# Patient Record
(demographics unavailable — no encounter records)

---

## 2024-10-29 NOTE — DISCUSSION/SUMMARY
[de-identified] : Discussed with patient the pathomechanics and pathophysiology of this condition.  Discussed with patient that I utilize a ladder analogy when determining treatment plans for ankle sprains.  Lowest rung on the ladder and easiest thing to do involves a trial of cam boot immobilization for 10 to 14 days, anti-inflammatories in the form of Mobic (a prescription was provided), weightbearing as tolerated, compression sock (information provided), and a plantar fascia night splint (information provided).  We discussed the risks and benefits associated with nonsteroidal anti-inflammatories.  Those risks include bleeding, kidney, GI issues.  Patient was instructed to take the medications with food.  And she was instructed to discontinue them should any abdominal or stomach discomfort occur.  I will see her back at 2 weeks time.  At that point we will reevaluate her degree of instability and initiate a functional rehabilitation protocol.  She will continue to maintain sleeping in the night splint for approximately 6 weeks in total.  Additionally she will begin on range of motion, edema control no passive or active inversion is to occur for at least 6 weeks.  Should her symptoms fail to improve or worsen by 6 weeks we will consider getting an advanced imaging in the form of MRI to ensure that we are not missing any other concurrent soft tissue pathology or chondral injuries that could be contributing to her symptomatology.  I am optimistic based on her history and physical examination that she will be able to heal this without requiring surgical fixation.  In the event that surgery is needed we discussed that appropriate surgical treatment would be based upon MRI findings, which would include either ligament repair versus reconstruction and potentially arthroscopic surgery, limited versus extensive debridement and addressing any chondral lesions that are present and/or any other associated pathology.  All questions were asked and answered.  Patient is in agreement with the plan.  She will follow-up accordingly.   The patient's current medication management of their orthopedic diagnosis was reviewed today: (1) We discussed a comprehensive treatment plan that included possible pharmaceutical management involving the use of prescription strength medications including but not limited to options such as oral Naprosyn 500mg BID, once daily Meloxicam 15 mg, or 500-650 mg Tylenol versus over the counter oral medications and topical prescription NSAID vs over the counter Voltaren gel. (2) There is a moderate risk of morbidity with further treatment, especially from use of prescription strength medications and possible side effects of these medications which include upset stomach with oral medications, skin reactions to topical medications and cardiac/renal issues with long term use. (3) I recommended that the patient follow-up with their medical physician to discuss any significant specific potential issues with long term medication use such as interactions with current medications or with exacerbation of underlying medical comorbidities. (4) The benefits and risks associated with use of injectable, oral or topical, prescription and over the counter anti-inflammatory medications were discussed with the patient. The patient voiced understanding of the risks including but not limited to bleeding, stroke, kidney dysfunction, heart disease, and were referred to the black box warning label for further information.     Plan for next visit: 1.  Assess ATFL CFL assess anterior drawer assess swelling, consider transition to ASO and PT with restriction of inversion active and passive for the next 4 weeks.

## 2024-10-29 NOTE — HISTORY OF PRESENT ILLNESS
[de-identified] : Date of Injury/Onset: 2 weeks ago Pain: At Rest: 5 With Activity: 6 Mechanism of injury: This is NOT a Work Related Injury being treated under Worker's Compensation. This is NOT an athletic injury occurring associated with an interscholastic or organized sports team. Quality of symptoms: Improves with: Worse with: Prior treatment: air cast Prior Imaging: x-rays Reports Available For Review Today: no Out of work/sport: working School/Sport/Position/Occupation:    10/29/2024 TANIA she is here today for evaluation of left ankle sprain. Patient denies JUDAH. Patient reports pain began about 2 weeks ago after walking a lot lately. Patient visited [facility] had x-rays done, was sent home with an air cast. Patient had been taken ibuprofen for pain relief. Patient reports some tingling and pain. Patient states pain is worse with stairs, standing, sitting and walking. Patient notes pain is localized.  Patient reports left ankle sprain back in January 2024 at work after lifting heavy boxes.

## 2024-10-29 NOTE — IMAGING
[de-identified] : Patient ambulates with a Antalgic gait   Left Foot and Ankle: Inspection: Erythema: None Swelling: Diffuse swelling laterally, anteriorly, medially Ecchymosis: None Abrasions: None Rashes: None Surgical Scars: None Effusion: None Atrophy: None Deformity: None Pes [place] Valgus: Negative Pes Cavus: Negative Hallux Valgus: Negative Clawtoe/Hammertoe: Negative  Palpation: Crepitus: None Proximal Fibula: Nontender Distal Fibula: Nontender Medial Malleolus: tender Lateral Malleolus: tender AITFL: tender PITFL: Nontender ATFL: tender CFL: tender Deltoid: tender Calcaneus: Nontender Talar Head/Neck: Nontender Lateral Process of Talus: Nontender Anterior Facet of Calcaneus: Nontender Peroneal Tendons: tender Posterior Tibialis: Nontender Achilles Tendon: Nontender & Intact Achilles Insertion: Nontender Retrocalcaneal Bursa: Nontender Anterior Capsule: tender Subtalar Joint: Nontender Talonavicular Joint: Nontender Calcaneocuboid Joint: Nontender Heel pad: Nontender Medial Tubercle of Calcaneus: Nontender Plantar Fascia: Nontender Midfoot: Nontender Forefoot: Nontender Sesamoids: Nontender  ROM: Ankle Dorsiflexion: 0 degrees Ankle Plantar Flexion: 35 degrees Eversion: 15 degrees Inversion: 25 degrees, painful  Motor: Dorsiflexion: 5 out of 5 Plantar Flexion: 5 out of 5 Inversion: 5  out of 5 Eversion: 5 out of 5, painful  Provocative Testing: Anterior Drawer: Positive Syndesmosis Squeeze Test: Negative Circumduction test: Negative Resisted ER: Painful  Axial Grind 1st MTP: Painless Neurologic Exam: L4-S1 Sensation: Grossly Intact Tinels: Negative  Vascular Exam/Pulses: Dorsalis Pedis: 2+ Posterior Tibialis: 2+ Capillary Refill: <2 Seconds Other Exams: None Pertinent Contralateral Findings: None

## 2024-11-19 NOTE — DISCUSSION/SUMMARY
[de-identified] : Discussed with patient the pathomechanics and pathophysiology of this condition.  Discussed with patient that I utilize a ladder analogy when determining treatment plans for ankle sprains.  Lowest rung on the ladder and easiest thing to do involves a trial of cam boot immobilization for 10 to 14 days, anti-inflammatories in the form of Mobic (a prescription was provided), weightbearing as tolerated, compression sock (information provided), and a plantar fascia night splint (information provided).  We discussed the risks and benefits associated with nonsteroidal anti-inflammatories.  Those risks include bleeding, kidney, GI issues.  Patient was instructed to take the medications with food.  And she was instructed to discontinue them should any abdominal or stomach discomfort occur.  I will see her back at 2 weeks time.  At that point we will reevaluate her degree of instability and initiate a functional rehabilitation protocol.  She will continue to maintain sleeping in the night splint for approximately 6 weeks in total.  Additionally she will begin on range of motion, edema control no passive or active inversion is to occur for at least 6 weeks.  Should her symptoms fail to improve or worsen by 6 weeks we will consider getting an advanced imaging in the form of MRI to ensure that we are not missing any other concurrent soft tissue pathology or chondral injuries that could be contributing to her symptomatology.  I am optimistic based on her history and physical examination that she will be able to heal this without requiring surgical fixation.  In the event that surgery is needed we discussed that appropriate surgical treatment would be based upon MRI findings, which would include either ligament repair versus reconstruction and potentially arthroscopic surgery, limited versus extensive debridement and addressing any chondral lesions that are present and/or any other associated pathology.  All questions were asked and answered.  Patient is in agreement with the plan.  She will follow-up accordingly.   The patient's current medication management of their orthopedic diagnosis was reviewed today: (1) We discussed a comprehensive treatment plan that included possible pharmaceutical management involving the use of prescription strength medications including but not limited to options such as oral Naprosyn 500mg BID, once daily Meloxicam 15 mg, or 500-650 mg Tylenol versus over the counter oral medications and topical prescription NSAID vs over the counter Voltaren gel. (2) There is a moderate risk of morbidity with further treatment, especially from use of prescription strength medications and possible side effects of these medications which include upset stomach with oral medications, skin reactions to topical medications and cardiac/renal issues with long term use. (3) I recommended that the patient follow-up with their medical physician to discuss any significant specific potential issues with long term medication use such as interactions with current medications or with exacerbation of underlying medical comorbidities. (4) The benefits and risks associated with use of injectable, oral or topical, prescription and over the counter anti-inflammatory medications were discussed with the patient. The patient voiced understanding of the risks including but not limited to bleeding, stroke, kidney dysfunction, heart disease, and were referred to the black box warning label for further information.   11/19/2024 f/u for ankle sprain, deltoid and talofibular ligaments.  still TTP along deltoid, ATFL, AITFL Continue Camboot 2 weeks day and night RTC 2 weeks Continue mobic    Plan for next visit: 1.  Assess ATFL CFL assess anterior drawer assess swelling, consider transition to ASO and PT with restriction of inversion active and passive for the next 4 weeks.

## 2024-11-19 NOTE — HISTORY OF PRESENT ILLNESS
[de-identified] : Date of Injury/Onset: 2 weeks ago Pain: At Rest: 5 With Activity: 6 Mechanism of injury: This is NOT a Work Related Injury being treated under Worker's Compensation. This is NOT an athletic injury occurring associated with an interscholastic or organized sports team. Quality of symptoms: Improves with: Worse with: Prior treatment: air cast Prior Imaging: x-rays Reports Available For Review Today: no Out of work/sport: working School/Sport/Position/Occupation:    10/29/2024 TANIA she is here today for evaluation of left ankle sprain. Patient denies JUDAH. Patient reports pain began about 2 weeks ago after walking a lot lately. Patient visited [facility] had x-rays done, was sent home with an air cast. Patient had been taken ibuprofen for pain relief. Patient reports some tingling and pain. Patient states pain is worse with stairs, standing, sitting and walking. Patient notes pain is localized.  Patient reports left ankle sprain back in January 2024 at work after lifting heavy boxes.   11/19/2024: Patient is here today to follow up on left ankle sprain.  Patient had been compliant with cam boot. She had been taken Mobic with relief. Patient reports some improvement with pain since last visit, mentions 60% improvement. able to walk without camboot at home.

## 2024-11-19 NOTE — IMAGING
[de-identified] : Patient ambulates with a Antalgic gait   Left Foot and Ankle: Inspection: Erythema: None Swelling: Diffuse swelling laterally, anteriorly, medially Ecchymosis: None Abrasions: None Rashes: None Surgical Scars: None Effusion: None Atrophy: None Deformity: None Pes [place] Valgus: Negative Pes Cavus: Negative Hallux Valgus: Negative Clawtoe/Hammertoe: Negative  Palpation: Crepitus: None Proximal Fibula: Nontender Distal Fibula: Nontender Medial Malleolus: tender Lateral Malleolus: tender AITFL: tender PITFL: Nontender ATFL: tender CFL: tender Deltoid: tender Calcaneus: Nontender Talar Head/Neck: Nontender Lateral Process of Talus: Nontender Anterior Facet of Calcaneus: Nontender Peroneal Tendons: tender Posterior Tibialis: Nontender Achilles Tendon: Nontender & Intact Achilles Insertion: Nontender Retrocalcaneal Bursa: Nontender Anterior Capsule: tender Subtalar Joint: Nontender Talonavicular Joint: Nontender Calcaneocuboid Joint: Nontender Heel pad: Nontender Medial Tubercle of Calcaneus: Nontender Plantar Fascia: Nontender Midfoot: Nontender Forefoot: Nontender Sesamoids: Nontender  ROM: Ankle Dorsiflexion: 0 degrees Ankle Plantar Flexion: 35 degrees Eversion: 15 degrees Inversion: 25 degrees, painful  Motor: Dorsiflexion: 5 out of 5 Plantar Flexion: 5 out of 5 Inversion: 5  out of 5 Eversion: 5 out of 5, painful  Provocative Testing: Anterior Drawer: Positive Syndesmosis Squeeze Test: Negative Circumduction test: Negative Resisted ER: Painful  Axial Grind 1st MTP: Painless Neurologic Exam: L4-S1 Sensation: Grossly Intact Tinels: Negative  Vascular Exam/Pulses: Dorsalis Pedis: 2+ Posterior Tibialis: 2+ Capillary Refill: <2 Seconds Other Exams: None Pertinent Contralateral Findings: None

## 2024-12-03 NOTE — HISTORY OF PRESENT ILLNESS
[de-identified] : Date of Injury/Onset: 2 weeks ago Pain: At Rest: 5 With Activity: 6 Mechanism of injury: This is NOT a Work Related Injury being treated under Worker's Compensation. This is NOT an athletic injury occurring associated with an interscholastic or organized sports team. Quality of symptoms: Improves with: Worse with: Prior treatment: air cast Prior Imaging: x-rays Reports Available For Review Today: no Out of work/sport: working School/Sport/Position/Occupation:    10/29/2024 TANIA she is here today for evaluation of left ankle sprain. Patient denies JUDAH. Patient reports pain began about 2 weeks ago after walking a lot lately. Patient visited [facility] had x-rays done, was sent home with an air cast. Patient had been taken ibuprofen for pain relief. Patient reports some tingling and pain. Patient states pain is worse with stairs, standing, sitting and walking. Patient notes pain is localized.  Patient reports left ankle sprain back in January 2024 at work after lifting heavy boxes.   11/19/2024: Patient is here today to follow up on left ankle sprain.  Patient had been compliant with cam boot. She had been taken Mobic with relief. Patient reports some improvement with pain since last visit, mentions 60% improvement. able to walk without camboot at home.   12/03/2024: Patient is here today to follow up on left ankle sprain. She continues to wear CAM walker, notes that she is still experiencing pain with the pain sometimes reaching 10/10, intermittent, nothing specific. Meloxicam with relief.

## 2024-12-03 NOTE — IMAGING
[de-identified] : Patient ambulates with a Antalgic gait   Left Foot and Ankle: Inspection: Erythema: None Swelling: mild swelling laterally, anteriorly, medially Ecchymosis: None Abrasions: None Rashes: None Surgical Scars: None Effusion: None Atrophy: None Deformity: None Pes [place] Valgus: Negative Pes Cavus: Negative Hallux Valgus: Negative Clawtoe/Hammertoe: Negative  Palpation: Crepitus: None Proximal Fibula: Nontender Distal Fibula: Nontender Medial Malleolus: nontender Lateral Malleolus: nontender AITFL: tender PITFL: Nontender ATFL: tender CFL: tender Deltoid: tender Calcaneus: Nontender Talar Head/Neck: Nontender Lateral Process of Talus: Nontender Anterior Facet of Calcaneus: Nontender Peroneal Tendons: tender Posterior Tibialis: Nontender Achilles Tendon: Nontender & Intact Achilles Insertion: Nontender Retrocalcaneal Bursa: Nontender Anterior Capsule: tender Subtalar Joint: Nontender Talonavicular Joint: Nontender Calcaneocuboid Joint: Nontender Heel pad: Nontender Medial Tubercle of Calcaneus: Nontender Plantar Fascia: Nontender Midfoot: Nontender Forefoot: Nontender Sesamoids: Nontender  ROM: Ankle Dorsiflexion: 0 degrees Ankle Plantar Flexion: 35 degrees Eversion: 15 degrees Inversion: 25 degrees, painful  Motor: Dorsiflexion: 5 out of 5 Plantar Flexion: 5 out of 5 Inversion: 5  out of 5 Eversion: 5 out of 5, painful  Provocative Testing: Anterior Drawer: Positive Syndesmosis Squeeze Test: Negative Circumduction test: Negative Resisted ER: Painful  Axial Grind 1st MTP: Painless Neurologic Exam: L4-S1 Sensation: Grossly Intact Tinels: Negative  Vascular Exam/Pulses: Dorsalis Pedis: 2+ Posterior Tibialis: 2+ Capillary Refill: <2 Seconds Other Exams: None Pertinent Contralateral Findings: None    
Yes - the patient is able to be screened

## 2024-12-03 NOTE — DISCUSSION/SUMMARY
[de-identified] : Discussed with patient the pathomechanics and pathophysiology of this condition.  Discussed with patient that I utilize a ladder analogy when determining treatment plans for ankle sprains.  Lowest rung on the ladder and easiest thing to do involves a trial of cam boot immobilization for 10 to 14 days, anti-inflammatories in the form of Mobic (a prescription was provided), weightbearing as tolerated, compression sock (information provided), and a plantar fascia night splint (information provided).  We discussed the risks and benefits associated with nonsteroidal anti-inflammatories.  Those risks include bleeding, kidney, GI issues.  Patient was instructed to take the medications with food.  And she was instructed to discontinue them should any abdominal or stomach discomfort occur.  I will see her back at 2 weeks time.  At that point we will reevaluate her degree of instability and initiate a functional rehabilitation protocol.  She will continue to maintain sleeping in the night splint for approximately 6 weeks in total.  Additionally she will begin on range of motion, edema control no passive or active inversion is to occur for at least 6 weeks.  Should her symptoms fail to improve or worsen by 6 weeks we will consider getting an advanced imaging in the form of MRI to ensure that we are not missing any other concurrent soft tissue pathology or chondral injuries that could be contributing to her symptomatology.  I am optimistic based on her history and physical examination that she will be able to heal this without requiring surgical fixation.  In the event that surgery is needed we discussed that appropriate surgical treatment would be based upon MRI findings, which would include either ligament repair versus reconstruction and potentially arthroscopic surgery, limited versus extensive debridement and addressing any chondral lesions that are present and/or any other associated pathology.  All questions were asked and answered.  Patient is in agreement with the plan.  She will follow-up accordingly.   The patient's current medication management of their orthopedic diagnosis was reviewed today: (1) We discussed a comprehensive treatment plan that included possible pharmaceutical management involving the use of prescription strength medications including but not limited to options such as oral Naprosyn 500mg BID, once daily Meloxicam 15 mg, or 500-650 mg Tylenol versus over the counter oral medications and topical prescription NSAID vs over the counter Voltaren gel. (2) There is a moderate risk of morbidity with further treatment, especially from use of prescription strength medications and possible side effects of these medications which include upset stomach with oral medications, skin reactions to topical medications and cardiac/renal issues with long term use. (3) I recommended that the patient follow-up with their medical physician to discuss any significant specific potential issues with long term medication use such as interactions with current medications or with exacerbation of underlying medical comorbidities. (4) The benefits and risks associated with use of injectable, oral or topical, prescription and over the counter anti-inflammatory medications were discussed with the patient. The patient voiced understanding of the risks including but not limited to bleeding, stroke, kidney dysfunction, heart disease, and were referred to the black box warning label for further information.   11/19/2024 f/u for ankle sprain, deltoid and talofibular ligaments.  still TTP along deltoid, ATFL, AITFL Continue Camboot 2 weeks day and night RTC 2 weeks Continue mobic    Plan for next visit: 1.  Assess ATFL CFL assess anterior drawer assess swelling, consider transition to ASO and PT with restriction of inversion active and passive for the next 4 weeks. *** 12/3  ankle sprain, deltoid and talofibular ligaments.  still TTP along deltoid, ATFL, AITFL  despite being compliant with boot, night splint, nsaids Rec MR to look for OLT  RTC after MR Continue mobic  next visit: mr review possible sx disc v CSi

## 2024-12-24 NOTE — DISCUSSION/SUMMARY
[de-identified] : Discussed with patient the pathomechanics and pathophysiology of this condition.  Discussed with patient that I utilize a ladder analogy when determining treatment plans for ankle sprains.  Lowest rung on the ladder and easiest thing to do involves a trial of cam boot immobilization for 10 to 14 days, anti-inflammatories in the form of Mobic (a prescription was provided), weightbearing as tolerated, compression sock (information provided), and a plantar fascia night splint (information provided).  We discussed the risks and benefits associated with nonsteroidal anti-inflammatories.  Those risks include bleeding, kidney, GI issues.  Patient was instructed to take the medications with food.  And she was instructed to discontinue them should any abdominal or stomach discomfort occur.  I will see her back at 2 weeks time.  At that point we will reevaluate her degree of instability and initiate a functional rehabilitation protocol.  She will continue to maintain sleeping in the night splint for approximately 6 weeks in total.  Additionally she will begin on range of motion, edema control no passive or active inversion is to occur for at least 6 weeks.  Should her symptoms fail to improve or worsen by 6 weeks we will consider getting an advanced imaging in the form of MRI to ensure that we are not missing any other concurrent soft tissue pathology or chondral injuries that could be contributing to her symptomatology.  I am optimistic based on her history and physical examination that she will be able to heal this without requiring surgical fixation.  In the event that surgery is needed we discussed that appropriate surgical treatment would be based upon MRI findings, which would include either ligament repair versus reconstruction and potentially arthroscopic surgery, limited versus extensive debridement and addressing any chondral lesions that are present and/or any other associated pathology.  All questions were asked and answered.  Patient is in agreement with the plan.  She will follow-up accordingly.   The patient's current medication management of their orthopedic diagnosis was reviewed today: (1) We discussed a comprehensive treatment plan that included possible pharmaceutical management involving the use of prescription strength medications including but not limited to options such as oral Naprosyn 500mg BID, once daily Meloxicam 15 mg, or 500-650 mg Tylenol versus over the counter oral medications and topical prescription NSAID vs over the counter Voltaren gel. (2) There is a moderate risk of morbidity with further treatment, especially from use of prescription strength medications and possible side effects of these medications which include upset stomach with oral medications, skin reactions to topical medications and cardiac/renal issues with long term use. (3) I recommended that the patient follow-up with their medical physician to discuss any significant specific potential issues with long term medication use such as interactions with current medications or with exacerbation of underlying medical comorbidities. (4) The benefits and risks associated with use of injectable, oral or topical, prescription and over the counter anti-inflammatory medications were discussed with the patient. The patient voiced understanding of the risks including but not limited to bleeding, stroke, kidney dysfunction, heart disease, and were referred to the black box warning label for further information.   11/19/2024 f/u for ankle sprain, deltoid and talofibular ligaments.  still TTP along deltoid, ATFL, AITFL Continue Camboot 2 weeks day and night RTC 2 weeks Continue mobic    Plan for next visit: 1.  Assess ATFL CFL assess anterior drawer assess swelling, consider transition to ASO and PT with restriction of inversion active and passive for the next 4 weeks. *** 12/3  ankle sprain, deltoid and talofibular ligaments.  still TTP along deltoid, ATFL, AITFL  despite being compliant with boot, night splint, nsaids Rec MR to look for OLT  RTC after MR Continue mobic  next visit: mr review possible sx disc v CSi *** 12/24  ankle sprain, deltoid and talofibular ligaments. symptoms have resolved no longer TTP along deltoid, ATFL, AITFL, currently TTP along posterior tib tendon with pain with resisted inversion d/w pt that she has already failed what would be initial non-operative tx for posterior tib tendinitis being in a Camboot and taking Mobic, next rung on ladder in my algo would be a PRP injection, however, I would recommend she follow up with one of my partners as I am no longer treating symptomatic flatfoot surgically and would defer to my partner for further eval and management Continue mobic and camboot until being seen by them  next visit: mr review possible sx disc v CSi

## 2024-12-24 NOTE — HISTORY OF PRESENT ILLNESS
[de-identified] : Date of Injury/Onset: 2 weeks ago Pain: At Rest: 5 With Activity: 6 Mechanism of injury: This is NOT a Work Related Injury being treated under Worker's Compensation. This is NOT an athletic injury occurring associated with an interscholastic or organized sports team. Quality of symptoms: Improves with: Worse with: Prior treatment: air cast Prior Imaging: x-rays Reports Available For Review Today: no Out of work/sport: working School/Sport/Position/Occupation:    10/29/2024 TANIA she is here today for evaluation of left ankle sprain. Patient denies JUDAH. Patient reports pain began about 2 weeks ago after walking a lot lately. Patient visited [facility] had x-rays done, was sent home with an air cast. Patient had been taken ibuprofen for pain relief. Patient reports some tingling and pain. Patient states pain is worse with stairs, standing, sitting and walking. Patient notes pain is localized.  Patient reports left ankle sprain back in January 2024 at work after lifting heavy boxes.   11/19/2024: Patient is here today to follow up on left ankle sprain.  Patient had been compliant with cam boot. She had been taken Mobic with relief. Patient reports some improvement with pain since last visit, mentions 60% improvement. able to walk without camboot at home.   12/03/2024: Patient is here today to follow up on left ankle sprain. She continues to wear CAM walker, notes that she is still experiencing pain with the pain sometimes reaching 10/10, intermittent, nothing specific. Meloxicam with relief.   12/24/2024 TANIA  is here today to follow up on MRI results

## 2024-12-24 NOTE — IMAGING
[de-identified] : Patient ambulates with a Antalgic gait   Left Foot and Ankle: Inspection: Erythema: None Swelling: mild swelling laterally, anteriorly, medially Ecchymosis: None Abrasions: None Rashes: None Surgical Scars: None Effusion: None Atrophy: None Deformity: None Pes Commack Valgus: Negative Pes Cavus: Negative Hallux Valgus: Negative Clawtoe/Hammertoe: Negative  Palpation: Crepitus: None Proximal Fibula: Nontender Distal Fibula: Nontender Medial Malleolus: nontender Lateral Malleolus: nontender AITFL: tender PITFL: Nontender ATFL: tender CFL: tender Deltoid: tender Calcaneus: Nontender Talar Head/Neck: Nontender Lateral Process of Talus: Nontender Anterior Facet of Calcaneus: Nontender Peroneal Tendons: tender Posterior Tibialis: Nontender Achilles Tendon: Nontender & Intact Achilles Insertion: Nontender Retrocalcaneal Bursa: Nontender Anterior Capsule: tender Subtalar Joint: Nontender Talonavicular Joint: Nontender Calcaneocuboid Joint: Nontender Heel pad: Nontender Medial Tubercle of Calcaneus: Nontender Plantar Fascia: Nontender Midfoot: Nontender Forefoot: Nontender Sesamoids: Nontender  ROM: Ankle Dorsiflexion: 0 degrees Ankle Plantar Flexion: 35 degrees Eversion: 15 degrees Inversion: 25 degrees, painful  Motor: Dorsiflexion: 5 out of 5 Plantar Flexion: 5 out of 5 Inversion: 5  out of 5 Eversion: 5 out of 5, painful  Provocative Testing: Anterior Drawer: Positive Syndesmosis Squeeze Test: Negative Circumduction test: Negative Resisted ER: Painful  Axial Grind 1st MTP: Painless Neurologic Exam: L4-S1 Sensation: Grossly Intact Tinels: Negative  Vascular Exam/Pulses: Dorsalis Pedis: 2+ Posterior Tibialis: 2+ Capillary Refill: <2 Seconds Other Exams: None Pertinent Contralateral Findings: None

## 2024-12-24 NOTE — DATA REVIEWED
[FreeTextEntry1] : 3 v L ankle neg for fx or dislocation  [facility] MRI Left Ankle 12/18/24 1. Partial tear involving proximal fibers of the calcaneofibular ligament and partial tear/attenuated appearance of the posterior talofibular ligament, likely chronic/due to prior ankle sprains. No surrounding soft tissue edema to suggest an acute ligamentous injury. 2. Minimal increased signal within slightly enlarged insertional fibers of the posterior tibialis tendon, consistent with tendinosis. Note is made of mild posterior tibial and flexor hallucis tenosynovitis.

## 2024-12-30 NOTE — PHYSICAL EXAM
[Left] : left foot and ankle [NL (40)] : plantar flexion 40 degrees [NL 30)] : inversion 30 degrees [NL (20)] : eversion 20 degrees [5___] : eversion 5[unfilled]/5 [2+] : dorsalis pedis pulse: 2+ [] : patient ambulates without assistive device [FreeTextEntry8] : mild achilles, atfl and post tib ttp  [TWNoteComboBox7] : dorsiflexion 15 degrees

## 2024-12-30 NOTE — ASSESSMENT
[FreeTextEntry1] : wbat dc boot otc brace begin PT return to work 1/20 nsaids prn no surgical indication  f/up prn

## 2024-12-30 NOTE — HISTORY OF PRESENT ILLNESS
[Dull/Aching] : dull/aching [Sharp] : sharp [de-identified] : 12/30/2024: 2 months ankle pain. no specific injury. was see dr ortiz who tx w/ boot. taking mobic. no other treatment. no prior ankle probs. denies dm/tob.  [employer]. [employer] [] : This patient has had an injection before: no [FreeTextEntry1] : L ankle

## 2024-12-30 NOTE — DATA REVIEWED
[Outside X-rays] : outside x-rays [MRI] : MRI [Left] : left [Ankle] : ankle [I reviewed the films/CD and additionally noted] : I reviewed the films/CD and additionally noted [FreeTextEntry1] : no acute fx [FreeTextEntry2] : no acute pathology; old appearing lateral sprain, mild post tib tendonitis -- eliazar

## 2025-02-28 NOTE — PHYSICAL EXAM
[Alert] : alert [Oriented x 3] : ~L oriented x 3 [Well Nourished] : well nourished [Conjunctiva Non-injected] : conjunctiva non-injected [No Edema] : no edema [FreeTextEntry3] : Focused skin exam performed  The relevant portions of the exam were performed today  AAOx3, NAD, well-appearing / pleasant Focused examination within normal limits with the exception of:  Face and neck clear

## 2025-02-28 NOTE — HISTORY OF PRESENT ILLNESS
[FreeTextEntry1] : npa: facial swelling [de-identified] : 22F presenting today as a new patient for evaluation of swelling of her face and neck that started when she woke up on 2/13. Woke up swollen and itchy, denies rash. Took benadryl and zyrtec without improvement. Was evaluated in [facility] and found well-appearing, NAD, no evidence anaphylaxis or angioedema. CT soft tissue neck w/ IV contrast significant for mild fat stranding but work-up grossly benign.  Was d/c from ED on prednisone course. Notes swelling peristed through the end of the week. It has not recurred.

## 2025-02-28 NOTE — ASSESSMENT
[External notes review: [ enter provider(s) name(s) ] :____] : As part of my evaluation, I have reviewed prior clinical note(s) from provider(s) outside of my group practice. The name(s) are: [unfilled] [FreeTextEntry1] : History of #facial swelling, not present on exam today - No history of rash. - [facility] referral placed to Allergy. Patient to be seen by allergy as soon as possible.  - Will prescribe course of prednisone for patient to take if facial swelling occurs again and she is unable to be seen by Allergy or PCP. Pred 40 mg x 1 week followed by pred 20 mg x 1 week then stop. SED of oral steroids including but not limited to immunosuppression, increased risk of infection, HPA axis suppression, osteoporosis, glucocorticoid effects, HTN, hyperlipidemia, PUD/GI bleeding, psychiatric effects, cutaneous effects discussed.  - S/sx warranting ED visit reviewed.  RTC PRN

## 2025-03-26 NOTE — SOCIAL HISTORY
[House] : [unfilled] lives in a house  [Central Forced Air] : heating provided by central forced air [Central] : air conditioning provided by central unit [None] : none [Dust Mite Covers] : does not have dust mite covers [Feather Pillows] : does not have feather pillows [Feather Comforter] : does not have a feather comforter [Smokers in Household] : there are no smokers in the home [de-identified] : area rugs in bedroom and living room

## 2025-03-26 NOTE — HISTORY OF PRESENT ILLNESS
[Asthma] : asthma [Allergic Rhinitis] : allergic rhinitis [Eczematous rashes] : eczematous rashes [Food Allergies] : food allergies [Drug Allergies] : drug allergies [de-identified] : Jennifer is a 22-year-old female who reports the following history. On February 12 she awoke in the morning and noticed that her face and neck were swollen, burning and itchy. Took Benadryl and Zyrtec. No improvement in her symptoms. Went to ED and was given IV steroids and Famotidine. CT scan of neck was WNL as per Jennifer. She was discharged from ED on Prednisone and Epi-pen. It took approximately 1 week for symptoms to fully resolve. Jennifer reports that her breathing was never compromised. She did not have hives but when the swelling decreased her skin became flushed.  On March 11th, she reports having a similar episode upon waking. This episode was not as severe as the first. Took Benadryl and Prednisone. There was no significant improvement, so she gave herself the Epi-pen. Some improvement noted after Epi. The swelling fully resolved by the end of the day. She did not have any difficulty breathing, tongue did not swell, just face and neck. No hives.  In 2020 Jennifer reports that she had one episode of breaking out in hives on her legs. No episodes of hives since.  Jennifer reports no new meds, foods, skin care products, soaps, detergents etc...

## 2025-03-26 NOTE — ASSESSMENT
[FreeTextEntry1] : Idiopathic Angioedema. Asymptomatic at present. If she encounters another episode, instructed to start antihistamines and call office.  Tree pollen allergy. She typically does not get symptomatic in the spring. Antihistamine PRN if allergy symptoms arise.